# Patient Record
Sex: FEMALE | Race: WHITE | NOT HISPANIC OR LATINO | Employment: FULL TIME | URBAN - METROPOLITAN AREA
[De-identification: names, ages, dates, MRNs, and addresses within clinical notes are randomized per-mention and may not be internally consistent; named-entity substitution may affect disease eponyms.]

---

## 2017-03-06 ENCOUNTER — OFFICE VISIT - HEALTHEAST (OUTPATIENT)
Dept: INTERNAL MEDICINE | Facility: CLINIC | Age: 27
End: 2017-03-06

## 2017-03-06 DIAGNOSIS — D17.20 LIPOMA OF ARM: ICD-10-CM

## 2017-03-06 DIAGNOSIS — R42 LIGHTHEADEDNESS: ICD-10-CM

## 2017-03-06 DIAGNOSIS — L72.3 SEBACEOUS CYST: ICD-10-CM

## 2017-03-06 DIAGNOSIS — Z78.9 NONSMOKER: ICD-10-CM

## 2017-11-30 ENCOUNTER — OFFICE VISIT - HEALTHEAST (OUTPATIENT)
Dept: FAMILY MEDICINE | Facility: CLINIC | Age: 27
End: 2017-11-30

## 2017-11-30 DIAGNOSIS — N39.0 UTI (URINARY TRACT INFECTION): ICD-10-CM

## 2017-11-30 DIAGNOSIS — R11.0 NAUSEA: ICD-10-CM

## 2018-06-14 ENCOUNTER — OFFICE VISIT - HEALTHEAST (OUTPATIENT)
Dept: FAMILY MEDICINE | Facility: CLINIC | Age: 28
End: 2018-06-14

## 2018-06-14 DIAGNOSIS — R07.89 CHEST DISCOMFORT: ICD-10-CM

## 2018-06-14 LAB
ANION GAP SERPL CALCULATED.3IONS-SCNC: 13 MMOL/L (ref 5–18)
BUN SERPL-MCNC: 10 MG/DL (ref 8–22)
CHLORIDE BLD-SCNC: 102 MMOL/L (ref 98–107)
CO2 SERPL-SCNC: 26 MMOL/L (ref 22–31)
CREAT SERPL-MCNC: 0.7 MG/DL (ref 0.7–1.3)
GLUCOSE BLD-MCNC: 98 MG/DL (ref 70–125)
POTASSIUM BLD-SCNC: 3.8 MMOL/L (ref 3.5–5.5)
SODIUM SERPL-SCNC: 141 MMOL/L (ref 136–145)

## 2021-05-30 VITALS — BODY MASS INDEX: 20.44 KG/M2 | WEIGHT: 126 LBS

## 2021-05-31 VITALS — BODY MASS INDEX: 20.18 KG/M2 | WEIGHT: 124.4 LBS

## 2021-06-01 VITALS — BODY MASS INDEX: 20.44 KG/M2 | WEIGHT: 126 LBS

## 2021-06-14 NOTE — PROGRESS NOTES
Assessment:       UTI       Plan:      Antibiotics as prescribed    Fluids  Discussed signs of worsening infection and when to follow-up with PCP if no symptom improvement.    Patient Instructions   Analysis of your urine showed signs of a urinary tract infection.     Take the prescribed antibiotics for the entire course, even if symptoms improve.  You should expect improvement to begin in 24 hours. Drink plenty of fluids.    Reasons to come back for re-evaluation:  - Develop a fever, back or flank pain, or nausea and vomiting  - If symptoms have not completely improved after 72 hours  - Blood or dark black stools or vomiting          Subjective:       Pat Schulz is a 27 y.o. female who presents for evaluation of nausea. Onset of symptoms was 3 days ago. Patient describes nausea as moderate. Denies vomiting. Symptoms have been associated with mild abdominal pain and possible dry blood in her urine.  Patient denies fever, hematemesis and melena, as well as urinary symptoms including dysuria, frequency, and urgency. Patient currently has an IUD and expresses concern for ectopic pregnancy. Symptoms have waxed and waned.  Evaluation to date has been none. Treatment to date has been none.   The following portions of the patient's history were reviewed and updated as appropriate: allergies, current medications and problem list.    Review of Systems  Pertinent items are noted in HPI.      Objective:      /68  Pulse (!) 101  Temp 98  F (36.7  C) (Oral)   Resp 18  Wt 124 lb 6.4 oz (56.4 kg)  SpO2 100%  BMI 20.18 kg/m2  General appearance: alert, appears stated age, cooperative, mild distress and tearful  Lungs: clear to auscultation bilaterally and no rhonchi, rales, or wheezing  Heart: tachycardic, normal S1 and S2, no M/R/G   Back: no CVA tenderness  Abdomen: mild tenderness in LUQ and epigastric regions, soft, no gaurding, no masses, no organomegally, no rebound tenderness

## 2021-06-25 NOTE — PROGRESS NOTES
"Progress Notes by Mark Rogers MD at 3/6/2017  9:15 AM     Author: Mark Rogers MD Service: -- Author Type: Physician    Filed: 3/7/2017  8:10 AM Encounter Date: 3/6/2017 Status: Signed    : Mark Rogers MD (Physician)              [facility]    Comprehensive and complex medical care - Chronic disease management - Shared decision making - Care coordination - Compassionate care    Patient advocacy - Rational deprescribing - Minimally disruptive medicine - Ethical focus - Customized care    Date of Service: 3/6/2017  Primary Provider: G[facility]le Primary Care Provider    Patient Care Team:  No Primary Care Provider as PCP - General     ______________________________________________________________________     Patient's Pharmacy:    [facility] [id number] [address] [address] [address] [place] [address]  Phone: [phone] Fax: [phone]     Patient's Insurance:    Payor: MEDICA / Plan: [place] / Product Type: PMAP /     ______________________________________________________________________     Khristine ANNELISE MOVERN is 26 y.o. female who comes in today for:    Chief Complaint   Patient presents with   ? Second Opinion     \"fatty tissues\" in the shoulder neck and arm       Patient Active Problem List   Diagnosis   ? Nonsmoker     No current outpatient prescriptions on file.     Social History     Social History Narrative     ______________________________________________________________________     History of present illness:    The patient comes in today first to have a number of spots evaluated.  She's been told in the past that they're either fatty lumps or oil cyst.    She has one on her right upper back near the trapezius.  It's been there for a while.  It doesn't really fast on her getting larger.  It doesn't bother her otherwise.    She has one on her left arm.  This also does not bother her besides the fact that it is there.    We " reviewed her lightheadedness with her again.  She denies any anxiety.  It is when she gets up quickly that it occurs, but doesn't notice it any other time.  She denies any other balance issues with her.  Her blood work from a few years ago was reviewed with her.  She has no major issues with this.  She is not concerned about any particular disease.    On review of systems, the patient denies any shortness of breath.    ______________________________________________________________________    Exam:    Wt Readings from Last 3 Encounters:   03/06/17 126 lb (57.2 kg)   02/26/15 131 lb 4 oz (59.5 kg)     BP Readings from Last 3 Encounters:   03/06/17 100/64   02/26/15 98/64     Visit Vitals   ? /64   ? Temp 97.3  F (36.3  C) (Oral)   ? Wt 126 lb (57.2 kg)   ? BMI 20.44 kg/m2      The patient is comfortable, no acute distress.  Mood good.  Insight fair.  Eyes are nonicteric.  Neck is supple without mass.  No cervical adenopathy.  No thyromegaly. Heart regular rate and rhythm.  Lungs clear to auscultation bilaterally.  Respiratory effort is good.  Abdomen soft and nontender.  No hepatosplenomegaly.  Extremities no edema.  There is a sebaceous cyst of her right back and a lipoma of her left arm.      ______________________________________________________________________    Diagnostics:    Results for orders placed or performed in visit on 02/26/15   Thyroid Stimulating Hormone (TSH)   Result Value Ref Range    TSH 1.24 0.30 - 5.05 uIU/mL   Basic Metabolic Panel   Result Value Ref Range    Sodium 141 136 - 145 mmol/L    Potassium 3.4 (L) 3.5 - 5.0 mmol/L    Chloride 106 98 - 107 mmol/L    CO2 24 22 - 31 mmol/L    Anion Gap, Calculation 11 5 - 18 mmol/L    Glucose 80 70 - 125 mg/dL    Calcium 9.4 8.5 - 10.5 mg/dL    BUN 8 8 - 22 mg/dL    Creatinine 0.70 0.60 - 1.10 mg/dL    GFR MDRD Af Amer >60 >60 mL/min/1.73m2    GFR MDRD Non Af Amer >60 >60 mL/min/1.73m2   Hepatic Profile   Result Value Ref Range    Bilirubin, Total  2.4 (H) 0.0 - 1.0 mg/dL    Bilirubin, Direct 0.5 <=0.5 mg/dL    Protein, Total 6.9 6.0 - 8.0 g/dL    Albumin 4.2 3.5 - 5.0 g/dL    Alkaline Phosphatase 61 45 - 120 U/L    AST 12 0 - 40 U/L    ALT 8 0 - 45 U/L   HM1 (CBC with Diff)   Result Value Ref Range    WBC 4.9 4.0 - 11.0 thou/uL    RBC 4.70 3.80 - 5.40 mill/uL    Hemoglobin 13.5 12.0 - 16.0 g/dL    Hematocrit 39.3 35.0 - 47.0 %    MCV 84 80 - 100 fL    MCH 28.7 27.0 - 34.0 pg    MCHC 34.3 32.0 - 36.0 g/dL    RDW 12.9 11.0 - 14.5 %    Platelets 257 140 - 440 thou/uL    MPV 6.8 (L) 7.0 - 10.0 fL    Neutrophils % 52 50 - 70 %    Lymphocytes % 39 20 - 40 %    Monocytes % 6 2 - 10 %    Eosinophils % 3 0 - 6 %    Basophils % 0 0 - 2 %    Neutrophils Absolute 2.5 2.0 - 7.7 thou/uL    Lymphocytes Absolute 1.9 0.8 - 4.4 thou/uL    Monocytes Absolute 0.3 0.0 - 0.9 thou/uL    Eosinophils Absolute 0.2 0.0 - 0.4 thou/uL    Basophils Absolute 0.0 0.0 - 0.2 thou/uL      ______________________________________________________________________    Assessment:    1. Sebaceous cyst    2. Nonsmoker    3. Lipoma of arm    4. Lightheadedness       ______________________________________________________________________      No Data Recorded  No Data Recorded    Plan:    1. Patient reassured about sebaceous cyst on her right back.  2. Lipoma on the left arm not severe enough to do anything about.  3. We talked a lot about her lightheadedness today and mainly reassured her about this.  There is no signs of serious disease.    Mark Rogers MD  General Internal Medicine  [facility]     Return if symptoms worsen or fail to improve.

## 2021-06-26 NOTE — PROGRESS NOTES
Progress Notes by Annelise Portillo MD at 6/14/2018 10:50 AM     Author: Annelise Portillo MD Service: -- Author Type: Physician    Filed: 6/14/2018 12:29 PM Encounter Date: 6/14/2018 Status: Addendum    : Annelise Portillo MD (Physician)    Related Notes: Original Note by Annelise Portillo MD (Physician) filed at 6/14/2018 12:27 PM         Subjective:   Pat Schulz is a 27 y.o. female  No question data found.  Chief Complaint   Patient presents with   ? Chest Pain     Pt is having heaviness to the chest area.Has been an ongoing problem for a few years.    Says she gets some chest flicker in her chest, lightening bolt. It happens at any time, and not related to any activity or eating. Says she stops doing something, but more from the surprise of it. The sensation lasts about a second. Denies any shortness of breath when she gets the sensation.  Says that the chest discomfort can happen once in 6 months, and that it comes very randomly. Says she came in because she felt a heaviness on the left side of her chest last night which lasted for a couple of hours. She felt like she had to catch her breath. Her mom and grandmother have COPD. Denies any family history of COPD. Pt is a non smoker. She is concerned that it may be heart related. Admits she is under more marital stress currently. Denies any history of HTN, DM, and high cholesterol. Denies any history of heartburn, feeling like food is stuck or having a sour taste in the back of her throat. Says she used to have acid reflux symptoms as a teen, and took medication for it. Denies any recent fevers, chills, nasal congestion, runny nose, cough or headache. Sometimes she feels like she is involuntarily having the jitters. Admits that patient has been eating, drinking and urinating normally.  Denies any recent belly pain, nausea, vomiting or diarrhea. Admits some left breast discomfort with pressure during sex.  Says that she has not started any recent  exercise program and does not do any heavy lifting at work at [employer].    Review of Systems  See HPI for ROS, otherwise all other systems negative    Allergies   Allergen Reactions   ? Pollen Extracts      No current outpatient prescriptions on file.  Patient Active Problem List   Diagnosis   ? Nonsmoker     Medical History Reviewed  Objective:     Vitals:    06/14/18 1106   BP: 100/60   Pulse: 95   Resp: 14   Temp: 98.8  F (37.1  C)   TempSrc: Oral   SpO2: 99%   Weight: 126 lb (57.2 kg)   Gen - Pt in NAD  Eyes - Conjunctiva non injected, no drainage  Face - non TTP over frontal sinus areas; non TTP over maxillary areas  Ears - external canals - no induration, Right TM - not injected, Left TM - occluded with cerumen   Nose - non congested, no nasal drainage  Pharynx - not injected, tonsils 1+ size  Neck - supple, no cervical adenopathy, no masses  Cor - RRR w/o murmur  Chest wall - non TTP  Lungs - Good air entry; no wheezes or crackles noted on auscultation - no coughing noted  Skin - no lesions, no rashes noted  Psych - Affect - euthymic, well groomed, speech not pressured, good insight, no flight of ideas  PHQ 2 = 0    Results for orders placed or performed in visit on 06/14/18   ISTAT CHEM 7 ([facility] CLINIC ONLY)   Result Value Ref Range    Sodium 141 136 - 145 mmol/L    Potassium 3.8 3.5 - 5.5 mmol/L    CO2 26 22 - 31 mmol/L    Chloride 102 98 - 107 mmol/L    Anion Gap, Calculation 13 5 - 18 mmol/L    Glucose 98 70 - 125 mg/dL    BUN 10 8 - 22 mg/dL    Creatinine 0.70 0.70 - 1.30 mg/dL     Lab result discussed on day of visit.      Assessment - Plan   Medical Decision Making -27-year-old woman presents with random intermittent episodes of left-sided chest discomfort.  Patient has no cardiac risk factors.  Patient's vital signs were entirely within normal limits and her exam was negative.  I-STAT was negative, ruling out diabetes or kidney etiologies as a cause of her discomfort.  Because of patient not having any  cardiac risk factors and no family history, and EKG was not indicated.  Patient does have a history of reflux but no recent reflux symptoms.  She also has admits that she has been under more marital stress lately.  Discussed with patient indications for either calling the care line or calling 911.  Patient's symptoms seem to be more anxiety/panic attacks at this time.    1. Chest discomfort  - ISTAT CHEM 7 ([facility] CLINIC ONLY)    At the conclusion of the encounter, assessment and plan were discussed.   All questions were answered.   The patient or guardian acknowledged understanding and was involved in the decision making regarding the overall care plan.    25 minutes spent with the patient with greater than 50% of time spent discussing symptoms, treatment options, counseling and/or coordination of care.     Patient Instructions   1. Keep appointment with primary care on 6/25  2. Keep well hydrated  3. Keep track of what you are doing when you have another episode of chest discomfort and how long the discomfort last  4. If symptoms are getting worse over time or you have any questions, call the clinic number - it's answered 24/7    Uncertain Causes of Chest Pain    Chest pain can happen for a number of reasons. Sometimes the cause can't be determined. If your condition does not seem serious, and your pain does not appear to be coming from your heart, your healthcare provider may recommend watching it closely. Sometimes the signs of a serious problem take more time to appear. Many problems not related to your heart can cause chest pain.These include:    Musculoskeletal. Costochondritis, an inflammation of the tissues around the ribs that can occur from trauma or overuse injuries    Respiratory. Pneumonia, pneumothorax, or pneumonitis (inflammation of the lining of the chest and lungs)    Gastrointestinal. Esophageal reflux, heartburn, or gallbladder disease    Anxiety and panic disorders    Nerve compression and  neuritis    Miscellaneous problems such as aortic aneurysm or pulmonary embolism (a blood clot in the lungs)  Home care  After your visit, follow these recommendations:    Rest today and avoid strenuous activity.    Take any prescribed medicine as directed.    Be aware of any recurrent chest pain and notice any changes  Follow-up care  Follow up with your healthcare provider if you do not start to feel better within 24 hours, or as advised.  Call 911  Call 911 if any of these occur:    A change in the type of pain: if it feels different, becomes more severe, lasts longer, or begins to spread into your shoulder, arm, neck, jaw or back    Shortness of breath or increased pain with breathing    Weakness, dizziness, or fainting    Rapid heart beat    Crushing sensation in your chest  When to seek medical advice  Call your healthcare provider right away if any of the following occur:    Cough with dark colored sputum (phlegm) or blood    Fever of 100.4 F (38 C) or higher, or as directed by your healthcare provider    Swelling, pain or redness in one leg    Shortness of breath  Date Last Reviewed: 12/30/2015 2000-2017 [employer]. [address] [place] [address]. All rights reserved. This information is not intended as a substitute for professional medical care. Always follow your healthcare professional's instructions.

## 2021-08-21 ENCOUNTER — HEALTH MAINTENANCE LETTER (OUTPATIENT)
Age: 31
End: 2021-08-21

## 2021-10-16 ENCOUNTER — HEALTH MAINTENANCE LETTER (OUTPATIENT)
Age: 31
End: 2021-10-16

## 2021-10-20 ENCOUNTER — OFFICE VISIT (OUTPATIENT)
Dept: FAMILY MEDICINE | Facility: CLINIC | Age: 31
End: 2021-10-20
Payer: COMMERCIAL

## 2021-10-20 VITALS
OXYGEN SATURATION: 100 % | BODY MASS INDEX: 21.89 KG/M2 | WEIGHT: 136.19 LBS | HEIGHT: 66 IN | DIASTOLIC BLOOD PRESSURE: 70 MMHG | HEART RATE: 92 BPM | SYSTOLIC BLOOD PRESSURE: 110 MMHG

## 2021-10-20 DIAGNOSIS — S90.31XA TRAUMATIC ECCHYMOSIS OF RIGHT FOOT, INITIAL ENCOUNTER: ICD-10-CM

## 2021-10-20 DIAGNOSIS — G89.29 CHRONIC BILATERAL LOW BACK PAIN WITHOUT SCIATICA: ICD-10-CM

## 2021-10-20 DIAGNOSIS — Z97.5 IUD (INTRAUTERINE DEVICE) IN PLACE: ICD-10-CM

## 2021-10-20 DIAGNOSIS — Z28.21 COVID-19 VACCINATION DECLINED: ICD-10-CM

## 2021-10-20 DIAGNOSIS — M54.50 CHRONIC BILATERAL LOW BACK PAIN WITHOUT SCIATICA: ICD-10-CM

## 2021-10-20 DIAGNOSIS — Z00.00 ROUTINE GENERAL MEDICAL EXAMINATION AT A HEALTH CARE FACILITY: Primary | ICD-10-CM

## 2021-10-20 DIAGNOSIS — Z23 NEEDS FLU SHOT: ICD-10-CM

## 2021-10-20 LAB
CHOLEST SERPL-MCNC: 168 MG/DL
ERYTHROCYTE [DISTWIDTH] IN BLOOD BY AUTOMATED COUNT: 11.6 % (ref 10–15)
FASTING STATUS PATIENT QL REPORTED: NO
HBA1C MFR BLD: 5.5 % (ref 0–5.6)
HCT VFR BLD AUTO: 43.5 % (ref 35–47)
HDLC SERPL-MCNC: 51 MG/DL
HGB BLD-MCNC: 14.6 G/DL (ref 11.7–15.7)
LDLC SERPL CALC-MCNC: 102 MG/DL
MCH RBC QN AUTO: 29.8 PG (ref 26.5–33)
MCHC RBC AUTO-ENTMCNC: 33.6 G/DL (ref 31.5–36.5)
MCV RBC AUTO: 89 FL (ref 78–100)
PLATELET # BLD AUTO: 276 10E3/UL (ref 150–450)
RBC # BLD AUTO: 4.9 10E6/UL (ref 3.8–5.2)
TRIGL SERPL-MCNC: 73 MG/DL
TSH SERPL DL<=0.005 MIU/L-ACNC: 1.29 UIU/ML (ref 0.3–5)
WBC # BLD AUTO: 6.2 10E3/UL (ref 4–11)

## 2021-10-20 PROCEDURE — 90471 IMMUNIZATION ADMIN: CPT | Performed by: STUDENT IN AN ORGANIZED HEALTH CARE EDUCATION/TRAINING PROGRAM

## 2021-10-20 PROCEDURE — 85027 COMPLETE CBC AUTOMATED: CPT | Performed by: STUDENT IN AN ORGANIZED HEALTH CARE EDUCATION/TRAINING PROGRAM

## 2021-10-20 PROCEDURE — 87624 HPV HI-RISK TYP POOLED RSLT: CPT | Performed by: STUDENT IN AN ORGANIZED HEALTH CARE EDUCATION/TRAINING PROGRAM

## 2021-10-20 PROCEDURE — 84443 ASSAY THYROID STIM HORMONE: CPT | Performed by: STUDENT IN AN ORGANIZED HEALTH CARE EDUCATION/TRAINING PROGRAM

## 2021-10-20 PROCEDURE — 99385 PREV VISIT NEW AGE 18-39: CPT | Mod: 25 | Performed by: STUDENT IN AN ORGANIZED HEALTH CARE EDUCATION/TRAINING PROGRAM

## 2021-10-20 PROCEDURE — 83036 HEMOGLOBIN GLYCOSYLATED A1C: CPT | Performed by: STUDENT IN AN ORGANIZED HEALTH CARE EDUCATION/TRAINING PROGRAM

## 2021-10-20 PROCEDURE — 36415 COLL VENOUS BLD VENIPUNCTURE: CPT | Performed by: STUDENT IN AN ORGANIZED HEALTH CARE EDUCATION/TRAINING PROGRAM

## 2021-10-20 PROCEDURE — G0123 SCREEN CERV/VAG THIN LAYER: HCPCS | Performed by: STUDENT IN AN ORGANIZED HEALTH CARE EDUCATION/TRAINING PROGRAM

## 2021-10-20 PROCEDURE — 90686 IIV4 VACC NO PRSV 0.5 ML IM: CPT | Performed by: STUDENT IN AN ORGANIZED HEALTH CARE EDUCATION/TRAINING PROGRAM

## 2021-10-20 PROCEDURE — 99213 OFFICE O/P EST LOW 20 MIN: CPT | Mod: 25 | Performed by: STUDENT IN AN ORGANIZED HEALTH CARE EDUCATION/TRAINING PROGRAM

## 2021-10-20 PROCEDURE — G0124 SCREEN C/V THIN LAYER BY MD: HCPCS | Performed by: PATHOLOGY

## 2021-10-20 PROCEDURE — 80061 LIPID PANEL: CPT | Performed by: STUDENT IN AN ORGANIZED HEALTH CARE EDUCATION/TRAINING PROGRAM

## 2021-10-20 ASSESSMENT — MIFFLIN-ST. JEOR: SCORE: 1354.49

## 2021-10-20 NOTE — PROGRESS NOTES
SUBJECTIVE:   CC: Pat Schulz is an 31 year old woman who presents for preventive health visit.     Patient has been advised of split billing requirements and indicates understanding: Yes     Healthy Habits:     Getting at least 3 servings of Calcium per day:  Yes    Bi-annual eye exam:  NO    Dental care twice a year:  NO    Sleep apnea or symptoms of sleep apnea:  None    Diet:  Regular (no restrictions)    Frequency of exercise:  1 day/week    Duration of exercise:  N/A    Taking medications regularly:  Not Applicable    Medication side effects:  Not applicable    PHQ-2 Total Score: 0    Additional concerns today:  No       Acute concerns:     Ran into her mom's walker yesterday and pinky on the right side got hit. Immediate pain, swelling and bruising. Today, still quite bruised and hurts a bit less.     Feels like left ear is blocked. No Q tip. No drainage. No ear pain or loss of hearing. Fees muffled hearing.     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     Today's PHQ-2 Score:   PHQ-2 ( 1999 [employer]) 10/20/2021   Q1: Little interest or pleasure in doing things 0   Q2: Feeling down, depressed or hopeless 0   PHQ-2 Score 0   Q1: Little interest or pleasure in doing things Not at all   Q2: Feeling down, depressed or hopeless Not at all   PHQ-2 Score 0       Abuse: Current or Past (Physical, Sexual or Emotional) - No  Do you feel safe in your environment? Yes    Have you ever done Advance Care Planning? (For example, a Health Directive, POLST, or a discussion with a medical provider or your loved ones about your wishes): No, advance care planning information given to patient to review.  Advanced care planning was discussed at today's visit.    Social History     Tobacco Use     Smoking status: Never Smoker     Smokeless tobacco: Never Used   Substance Use Topics     Alcohol use: Not on file     If you drink alcohol do you typically have >3 drinks per day or >7 drinks per week?  No  Cigarettes/vaping/marijuana/drugs: no     Alcohol Use 10/20/2021   Prescreen: >3 drinks/day or >7 drinks/week? Not Applicable   Prescreen: >3 drinks/day or >7 drinks/week? -     Reviewed orders with patient.  Reviewed health maintenance and updated orders accordingly - Yes  Lab work is in process  Labs reviewed in EPIC  BP Readings from Last 3 Encounters:   10/20/21 110/70    Wt Readings from Last 3 Encounters:   10/20/21 61.8 kg (136 lb 3 oz)   18 57.2 kg (126 lb)   17 56.4 kg (124 lb 6.4 oz)                  Patient Active Problem List   Diagnosis     Nonsmoker     IUD (intrauterine device) in place     Past Surgical History:   Procedure Laterality Date      SECTION       twice       Social History     Tobacco Use     Smoking status: Never Smoker     Smokeless tobacco: Never Used   Substance Use Topics     Alcohol use: Not on file     Family History   Problem Relation Age of Onset     Chronic Obstructive Pulmonary Disease Mother      Hypertension Mother      Heart Failure Mother      Diabetes Father      Diabetes Type 1 Brother          Current Outpatient Medications   Medication Sig Dispense Refill     levonorgestrel (MIRENA) 20 MCG/24HR IUD 1 each by Intrauterine route once       Allergies   Allergen Reactions     Pollen Extracts [Pollen Extract] Unknown       Breast Cancer Screening:  Any new diagnosis of family breast, ovarian, or bowel cancer? No    Pertinent mammograms are reviewed under the imaging tab.    Lung cancer in grandma - was a smoker     History of abnormal Pap smear: Does not recall when her last Pap smear was.  Does not recall any history of abnormal Pap smears.     Reviewed and updated as needed this visit by clinical staff  Tobacco  Allergies  Meds   Med Hx  Surg Hx  Fam Hx          Reviewed and updated as needed this visit by Provider      Med Hx  Surg Hx  Fam Hx         History reviewed. No pertinent past medical history.   Past Surgical History:   Procedure  "Laterality Date      SECTION       twice       Review of Systems  CONSTITUTIONAL: NEGATIVE for fever, chills, change in weight  INTEGUMENTARU/SKIN: NEGATIVE for worrisome rashes, moles or lesions  EYES: NEGATIVE for vision changes or irritation  ENT: NEGATIVE for ear, mouth and throat problems  RESP: NEGATIVE for significant cough or SOB  BREAST: NEGATIVE for masses, tenderness or discharge  CV: NEGATIVE for chest pain, palpitations or peripheral edema  GI: NEGATIVE for nausea, abdominal pain, heartburn, or change in bowel habits  : NEGATIVE for unusual urinary or vaginal symptoms. Periods are regular.  MUSCULOSKELETAL: NEGATIVE for significant arthralgias or myalgia  NEURO: NEGATIVE for weakness, dizziness or paresthesias  PSYCHIATRIC: NEGATIVE for changes in mood or affect     OBJECTIVE:   /70 (BP Location: Right arm, Patient Position: Sitting)   Pulse 92   Ht 1.676 m (5' 6\")   Wt 61.8 kg (136 lb 3 oz)   LMP 10/16/2021 (Exact Date)   SpO2 100%   BMI 21.98 kg/m    Physical Exam  GENERAL: healthy, alert and no distress  EYES: Eyes grossly normal to inspection, PERRL and conjunctivae and sclerae normal  HENT: ear canals and TM's normal, nose and mouth without ulcers or lesions  NECK: no adenopathy, no asymmetry, masses, or scars and thyroid normal to palpation  RESP: lungs clear to auscultation - no rales, rhonchi or wheezes  CV: regular rate and rhythm, normal S1 S2, no S3 or S4, no murmur, click or rub, no peripheral edema and peripheral pulses strong  ABDOMEN: soft, nontender, no hepatosplenomegaly, no masses and bowel sounds normal   (female): normal female external genitalia, normal urethral meatus, vaginal mucosa pink, moist, well rugated, and normal cervix without masses or discharge.  No CMT.  IUD strings visualized.  MS: no gross musculoskeletal defects noted, no edema.  Patient has no pinpoint bony tenderness along the palpation of her spine.  Patient has hypertonic quadratus " lumborum muscles bilaterally.  Patient has significant bruising along the medial aspect of the right pinky toe with pinpoint tenderness of the MTP joint. (+) pain with axial loading of the joint.   SKIN: no suspicious lesions or rashes  NEURO: Normal strength and tone, mentation intact and speech normal  PSYCH: mentation appears normal, affect normal/bright    Diagnostic Test Results:  Labs reviewed in Epic    ASSESSMENT/PLAN:   Pat was seen today for physical and foot pain.    Diagnoses and all orders for this visit:    Routine general medical examination at a health care facility  COVID-19 vaccination declined  Normal BMI  No high risk behaviors  COVID-19 vaccine declined, ordered flu  Baseline blood work-due, ordered  Pap smear collected today  Birth control: IUD in place  Sexually active -yes, male partner, no concerns for STI   Feels safe in her relationship  Has 2 children s/p   Pap collected today  ACP done  -     TSH with free T4 reflex; Future  -     Lipid Profile (Chol, Trig, HDL, LDL calc); Future  -     Hemoglobin A1c; Future  -     CBC with platelets; Future  -     INFLUENZA VACCINE IM > 6 MONTHS VALENT IIV4 (AFLURIA/FLUZONE)  -     PAP screen with HPV - recommended age 30 - 65 years    IUD (intrauterine device) in place  Placed for second time in  - remove . Has irregular periods on the IUD. Is not bothersome     Chronic bilateral low back pain without sciatica  Low back pain seems to be 2/2 overuse injury in the setting of physically demanding occupation.  She is welcome to continue to go to chiropractor if she feels that that helps but I believe that physical therapy may be a better long-term fix.  Will refer to physical therapy to help strengthen the back.  Patient amenable.  -     Physical Therapy Referral; Future    Traumatic ecchymosis of right foot, initial encounter  Physical exam consistent with significant ecchymosis along the medial aspect of the right pinky toe.   "Pinpoint tenderness of the area as well.  Will get x-ray to rule out fracture.  -     XR Foot Right G/E 3 Views; Future      Other orders  -     REVIEW OF HEALTH MAINTENANCE PROTOCOL ORDERS    Patient has been advised of split billing requirements and indicates understanding: Yes  COUNSELING:  Reviewed preventive health counseling, as reflected in patient instructions    Estimated body mass index is 21.98 kg/m  as calculated from the following:    Height as of this encounter: 1.676 m (5' 6\").    Weight as of this encounter: 61.8 kg (136 lb 3 oz).        She reports that she has never smoked. She has never used smokeless tobacco.      Kavita Brock DO  [facility]  "

## 2021-10-20 NOTE — LETTER
October 27, 2021      Pat Schulz  [address]  [address]  [place] [address]        Dear ,    We are writing to inform you of your test results.    Your do have small fracture in your pinky toe. Would recommend elevation of your foot, icing the area 15 min at a time up to three times a day and avoidance of tight fitting shoes. Would like you to mervat tape the foot as to help with pain and stabilization of the fracture. Place a cotton ball between the two toes that you will be mervat taping to avoid skin breakdown. t will take 4-6 weeks for it to heal. Ok to do iburpofen/tylenol for pain control.    Resulted Orders   XR Foot Right G/E 3 Views    Narrative    EXAM DATE:         10/20/2021    EXAM: X-RAY FOOT RIGHT, MINIMUM 3 VIEWS  LOCATION: [facility]  DATE/TIME: 10/20/2021 3:30 PM    INDICATION: (Plus) traumatic ecchymosis of pinky toe, suspect fracture. Problem/pain.  COMPARISON: None.    IMPRESSION: There are two adjacent tiny age-indeterminate avulsion fracture fragments at the medial aspect of the fifth PIP joint and distal aspect of the proximal phalanx.             TSH with free T4 reflex   Result Value Ref Range    TSH 1.29 0.30 - 5.00 uIU/mL   Lipid Profile (Chol, Trig, HDL, LDL calc)   Result Value Ref Range    Cholesterol 168 <=199 mg/dL    Triglycerides 73 <=149 mg/dL    Direct Measure HDL 51 >=50 mg/dL      Comment:      HDL Cholesterol Reference Range:     0-2 years:   No reference ranges established for patients under 2 years old  at [facility] for lipid analytes.    2-8 years:  Greater than 45 mg/dL     18 years and older:   Female: Greater than or equal to 50 mg/dL   Male:   Greater than or equal to 40 mg/dL    LDL Cholesterol Calculated 102 <=129 mg/dL    Patient Fasting > 8hrs? No    Hemoglobin A1c   Result Value Ref Range    Hemoglobin A1C 5.5 0.0 - 5.6 %      Comment:      Normal <5.7%   Prediabetes 5.7-6.4%    Diabetes 6.5% or higher      Note: Adopted from ADA consensus guidelines.   CBC with platelets   Result Value Ref Range    WBC Count 6.2 4.0 - 11.0 10e3/uL    RBC Count 4.90 3.80 - 5.20 10e6/uL    Hemoglobin 14.6 11.7 - 15.7 g/dL    Hematocrit 43.5 35.0 - 47.0 %    MCV 89 78 - 100 fL    MCH 29.8 26.5 - 33.0 pg    MCHC 33.6 31.5 - 36.5 g/dL    RDW 11.6 10.0 - 15.0 %    Platelet Count 276 150 - 450 10e3/uL       If you have any questions or concerns, please call the clinic at the number listed above.       Sincerely,      Kavita Brock, DO

## 2021-10-22 LAB
HUMAN PAPILLOMA VIRUS 16 DNA: POSITIVE
HUMAN PAPILLOMA VIRUS 18 DNA: NEGATIVE
HUMAN PAPILLOMA VIRUS FINAL DIAGNOSIS: ABNORMAL
HUMAN PAPILLOMA VIRUS OTHER HR: POSITIVE

## 2021-10-28 ENCOUNTER — PATIENT OUTREACH (OUTPATIENT)
Dept: FAMILY MEDICINE | Facility: CLINIC | Age: 31
End: 2021-10-28

## 2021-10-28 LAB
BKR LAB AP GYN ADEQUACY: ABNORMAL
BKR LAB AP GYN INTERPRETATION: ABNORMAL
BKR LAB AP HPV REFLEX: ABNORMAL
BKR LAB AP LMP: ABNORMAL
BKR LAB AP PREVIOUS ABNORMAL: ABNORMAL
PATH REPORT.COMMENTS IMP SPEC: ABNORMAL
PATH REPORT.RELEVANT HX SPEC: ABNORMAL

## 2021-11-18 ENCOUNTER — OFFICE VISIT (OUTPATIENT)
Dept: INTERNAL MEDICINE | Facility: CLINIC | Age: 31
End: 2021-11-18
Payer: COMMERCIAL

## 2021-11-18 VITALS — BODY MASS INDEX: 21.79 KG/M2 | HEART RATE: 98 BPM | OXYGEN SATURATION: 98 % | WEIGHT: 135 LBS

## 2021-11-18 DIAGNOSIS — R87.611 ATYPICAL SQUAMOUS CELLS CANNOT EXCLUDE HIGH GRADE SQUAMOUS INTRAEPITHELIAL LESION ON CYTOLOGIC SMEAR OF CERVIX (ASC-H): ICD-10-CM

## 2021-11-18 DIAGNOSIS — Z01.812 PRE-PROCEDURE LAB EXAM: Primary | ICD-10-CM

## 2021-11-18 DIAGNOSIS — Z11.3 ROUTINE SCREENING FOR STI (SEXUALLY TRANSMITTED INFECTION): ICD-10-CM

## 2021-11-18 LAB — HCG UR QL: NEGATIVE

## 2021-11-18 PROCEDURE — 2894A SURGICAL PATHOLOGY EXAM: CPT | Mod: 59 | Performed by: PATHOLOGY

## 2021-11-18 PROCEDURE — 81025 URINE PREGNANCY TEST: CPT | Performed by: NURSE PRACTITIONER

## 2021-11-18 PROCEDURE — 57454 BX/CURETT OF CERVIX W/SCOPE: CPT | Performed by: NURSE PRACTITIONER

## 2021-11-18 PROCEDURE — 87591 N.GONORRHOEAE DNA AMP PROB: CPT | Performed by: NURSE PRACTITIONER

## 2021-11-18 PROCEDURE — 87491 CHLMYD TRACH DNA AMP PROBE: CPT | Performed by: NURSE PRACTITIONER

## 2021-11-18 PROCEDURE — 88305 TISSUE EXAM BY PATHOLOGIST: CPT | Performed by: PATHOLOGY

## 2021-11-18 NOTE — PROGRESS NOTES
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  No history of abnormal pap smears. No tobacco use. She is in a relationship and may try to conceive in the next 1 year.     Procedure Details   Body mass index is 21.79 kg/m .    The reason for procedure is explained, and verbal informed consent is obtained.    Speculum is placed in the vagina and visualization of cervix is achieved.   Visualization of the cervix: fully visible  Visualization of the squamocolumnar junction: fully visible    The cervix is swabbed with 3% acetic acid solution. There are thin acetowhite changes at 7 o'clock, favored to be metaplasia, and fine punctation noted at 6:00. Biopsy is taken at these two sites. ECC also completed. The patient tolerated the procedure well.    Impression: Normal/low grade     Plan: Specimen labelled and sent to Pathology. Post procedure instructions are reviewed.  The role of HPV in causing cervical pap smear abnormalities, dysplasia, and cancer is reviewed with the patient. She will be contacted by phone with biopsy results and recommendations.      Addendum 11/24/21: biopsy is negative for HEIDY. Return for co-testing in 1 year.

## 2021-11-19 LAB
C TRACH DNA SPEC QL NAA+PROBE: NEGATIVE
N GONORRHOEA DNA SPEC QL NAA+PROBE: NEGATIVE

## 2021-11-23 LAB
PATH REPORT.COMMENTS IMP SPEC: NORMAL
PATH REPORT.FINAL DX SPEC: NORMAL
PATH REPORT.GROSS SPEC: NORMAL
PATH REPORT.MICROSCOPIC SPEC OTHER STN: NORMAL
PATH REPORT.RELEVANT HX SPEC: NORMAL
PHOTO IMAGE: NORMAL

## 2021-11-24 ENCOUNTER — TELEPHONE (OUTPATIENT)
Dept: INTERNAL MEDICINE | Facility: CLINIC | Age: 31
End: 2021-11-24
Payer: COMMERCIAL

## 2021-11-24 NOTE — TELEPHONE ENCOUNTER
The phone number listed is not in service. Will send patient a QReca! message with colposcopy biopsy results

## 2021-12-17 ENCOUNTER — PATIENT OUTREACH (OUTPATIENT)
Dept: FAMILY MEDICINE | Facility: CLINIC | Age: 31
End: 2021-12-17
Payer: COMMERCIAL

## 2022-04-27 ENCOUNTER — OFFICE VISIT (OUTPATIENT)
Dept: FAMILY MEDICINE | Facility: CLINIC | Age: 32
End: 2022-04-27
Payer: COMMERCIAL

## 2022-04-27 VITALS
DIASTOLIC BLOOD PRESSURE: 67 MMHG | SYSTOLIC BLOOD PRESSURE: 102 MMHG | BODY MASS INDEX: 22.47 KG/M2 | TEMPERATURE: 97.6 F | OXYGEN SATURATION: 100 % | RESPIRATION RATE: 22 BRPM | HEART RATE: 90 BPM | WEIGHT: 139.2 LBS

## 2022-04-27 DIAGNOSIS — Z48.02 VISIT FOR SUTURE REMOVAL: Primary | ICD-10-CM

## 2022-04-27 PROCEDURE — 99213 OFFICE O/P EST LOW 20 MIN: CPT | Performed by: PHYSICIAN ASSISTANT

## 2022-04-27 NOTE — PROGRESS NOTES
Patient presents with:  Suture Removal: Had stitches on 4/14/22 on Rt pinky side. 2 stitches not taken out. Pt states still feel sensitive at [facility] area      Clinical Decision Making:  Had a conversation with patient stating that she should avoid shear force and also basic treatment for the sutures after removal. Expected course of resolution and indication for return was gone over and questions were answered to patient/parent's satisfaction before discharge.        ICD-10-CM    1. Visit for suture removal  Z48.02        There are no Patient Instructions on file for this visit.    HPI:  Pat Schulz is a 31 year old female who presents today for wound check and suture removal from laceration on the right hand resolving from 4/14/2022.  Patient has no concerns to address and says that she is healing very well.    History obtained from chart review and the patient.    Problem List:  2021-10: IUD (intrauterine device) in place  2021-10: Atypical squamous cells cannot exclude high grade squamous   intraepithelial lesion on cytologic smear of cervix (ASC-H)  Nonsmoker      Past Medical History:   Diagnosis Date     Atypical squamous cells cannot exclude high grade squamous intraepithelial lesion on cytologic smear of cervix (ASC-H) 10/20/2021    10/20/21 ASC-H, +HR HPV 16 & other. Plan: colposcopy due before 1/20/22       Social History     Tobacco Use     Smoking status: Never Smoker     Smokeless tobacco: Never Used   Substance Use Topics     Alcohol use: Not on file       Review of Systems  As above in HPI otherwise negative.    Vitals:    04/27/22 1338   BP: 102/67   BP Location: Right arm   Patient Position: Sitting   Cuff Size: Adult Regular   Pulse: 90   Resp: 22   Temp: 97.6  F (36.4  C)   TempSrc: Oral   SpO2: 100%   Weight: 63.1 kg (139 lb 3.2 oz)       General: Patient is resting comfortably no acute distress is afebrile  HEENT: Head is normocephalic atraumatic   Skin: Without rash  non-diaphoretic  Musculoskeletal: Patient is able make a good close tight fist. Inspection of the right hand shows that there are 2 sutures on the distal end of the fifth metacarpal.  Wound is nonreactive.  Sutures are removed.  There is no wound dehiscence.  Patient tolerated entire procedure very well.    Physical Exam    At the end of the encounter, I discussed results, diagnosis, medications. Discussed red flags for immediate return to clinic/ER, as well as indications for follow up if no improvement. Patient understood and agreed to plan. Patient was stable for discharge.

## 2022-10-01 ENCOUNTER — HEALTH MAINTENANCE LETTER (OUTPATIENT)
Age: 32
End: 2022-10-01

## 2022-11-03 ENCOUNTER — PATIENT OUTREACH (OUTPATIENT)
Dept: FAMILY MEDICINE | Facility: CLINIC | Age: 32
End: 2022-11-03

## 2022-11-03 NOTE — LETTER
November 3, 2022      Pat Schulz  [address] [facility] [address]        Dear ,    This letter is to remind you that you are due for your follow-up Pap smear and Human Papillomavirus (HPV) test.    Please call [phone] to schedule your appointment at your earliest convenience.    If you have completed the appointment outside of the [facility] system, please have the records forwarded to our office. We will update your chart for your provider to review before your next annual wellness visit.     Thank you for choosing [facility]!      Sincerely,    Your [facility] Care Team

## 2022-12-29 NOTE — TELEPHONE ENCOUNTER
FYI to provider - Patient is lost to pap tracking follow-up. Attempts to contact pt have been made per reminder process and there has been no reply and/or no appt scheduled. Contact hx listed below.     10/20/21 ASC-H, +HR HPV 16 & other. Plan: colposcopy due before 1/20/22 11/18/21 Colpo bx atypia suspicious for HPV effect, ECC neg. Plan: cotest in 1 year  11/3/22 Reminder [place], Letter  12/1/22 Reminder call-lm  12/29/22 Lost to follow-up for pap tracking

## 2023-06-19 ENCOUNTER — NURSE TRIAGE (OUTPATIENT)
Dept: NURSING | Facility: CLINIC | Age: 33
End: 2023-06-19
Payer: COMMERCIAL

## 2023-06-19 NOTE — TELEPHONE ENCOUNTER
"Patient is sexually active and has the Murina IUD and is concerned that she may be pregnant. Patient spoke to her provider who placed the IUD and was told that it should be effective for a couple more years. Patient went to [facility] and is concerned about ectopic pregnancy. Patient has not completed a home pregnancy test.   Patient having some spotting. Yesterday she was having some left side neck pain but none today. Patient having some soreness and tenderness below the breasts but over the ribs. The pain is \"kind of like a band across her chest\".  No pain unless she is touching and pressing on this area. Some soreness when she stands up. This chest pain has been for the past two days. Patient denies injury or soreness pain due to working out.   Denies constant pain, back pain, shortness of breath, shoulder or jaw pain.   Protocol recommends see in office today.  [facility] location and hours given. Care advice given. Patient will call back with worsening symptoms.   Lacy Ribera RN   06/19/23 10:34 AM  [facility] Nurse Advisor      Reason for Disposition    Chest pain(s) lasting a few seconds persists > 3 days    Additional Information    Negative: Shock suspected (e.g., cold/pale/clammy skin, too weak to stand, low BP, rapid pulse)    Negative: Sounds like a life-threatening emergency to the triager    Negative: Abdominal pain and pregnant < 20 weeks    Negative: Vaginal bleeding and pregnant < 20 weeks    Negative: Vaginal discharge is main symptom    Negative: SEVERE abdominal pain (e.g., excruciating) and present > 1 hour    Negative: SEVERE vaginal bleeding (e.g., soaking 2 pads or tampons per hour and present 2 or more hours; 1 menstrual cup every 2 hours)    Negative: SEVERE dizziness (e.g., unable to stand, requires support to walk, feels like passing out now)    Negative: Patient sounds very sick or weak to the triager    Negative: MODERATE vaginal bleeding (e.g., soaking 1 pad or tampon per hour and present " > 6 hours; 1 menstrual cup every 6 hours)    Negative: Vaginal bleeding is WORSE than normal (e.g., heavier) and dizziness or lightheadedness    Negative: Constant abdominal pain and present > 2 hours    Negative: Caller has URGENT question and triager unable to answer question    Negative: MILD abdominal pain (e.g., does not interfere with normal activities) that comes and goes (cramps) and present > 48 hours    Negative: Passed out (i.e., fainted, collapsed and was not responding)    Negative: Difficult to awaken or acting confused (e.g., disoriented, slurred speech)    Negative: SEVERE difficulty breathing (e.g., struggling for each breath, speaks in single words)    Negative: Shock suspected (e.g., cold/pale/clammy skin, too weak to stand, low BP, rapid pulse)    Negative: Chest pain lasting longer than 5 minutes and ANY of the following:* Over 44 years old* Over 30 years old and at least one cardiac risk factor (e.g., diabetes mellitus, high blood pressure, high cholesterol, smoker, or strong family history of heart disease)* History of heart disease (i.e., angina, heart attack, heart failure, bypass surgery, takes nitroglycerin)* Pain is crushing, pressure-like, or heavy    Negative: Heart beating < 50 beats per minute OR > 140 beats per minute    Negative: Visible sweat on face or sweat dripping down face    Negative: Sounds like a life-threatening emergency to the triager    Negative: Followed an injury to chest    Negative: SEVERE chest pain    Negative: Pain also in shoulder(s) or arm(s) or jaw    Negative: Difficulty breathing    Negative: Cocaine use within last 3 days    Negative: Major surgery in the past month    Negative: Hip or leg fracture (broken bone) in past month (or had cast on leg or ankle in past month)    Negative: Illness requiring prolonged bedrest in past month (e.g., immobilization, long hospital stay)    Negative: Long-distance travel in past month (e.g., car, bus, train, plane; with  trip lasting 6 or more hours)    Negative: History of prior 'blood clot' in leg or lungs (i.e., deep vein thrombosis, pulmonary embolism)    Negative: History of inherited increased risk of blood clots (e.g., Factor 5 Leiden, Anti-thrombin 3, Protein C or Protein S deficiency, Prothrombin mutation)    Negative: Cancer treatment in the past two months (or has cancer now)    Negative: Heart beating irregularly or very rapidly    Negative: Chest pain lasting longer than 5 minutes and occurred in last 3 days (72 hours) (Exception: feels exactly the same as previously diagnosed heartburn and has accompanying sour taste in mouth)    Negative: Chest pain or 'angina' comes and goes and is happening more often (increasing in frequency) or getting worse (increasing in severity) (Exception: chest pains that last only a few seconds)    Negative: Dizziness or lightheadedness    Negative: Coughing up blood    Negative: Patient sounds very sick or weak to the triager    Negative: Patient says chest pain feels exactly the same as previously diagnosed 'heartburn' and describes burning in chest and accompanying sour taste in mouth    Negative: Fever > 100.4 F (38.0 C)    Protocols used: CHEST PAIN-A-OH, CONTRACEPTION - IUD SYMPTOMS AND [id number]

## 2023-10-15 ENCOUNTER — HEALTH MAINTENANCE LETTER (OUTPATIENT)
Age: 33
End: 2023-10-15

## 2024-01-09 ENCOUNTER — E-VISIT (OUTPATIENT)
Dept: FAMILY MEDICINE | Facility: CLINIC | Age: 34
End: 2024-01-09

## 2024-01-09 DIAGNOSIS — H93.8X9 SENSATION OF PLUGGED EAR, UNSPECIFIED LATERALITY: Primary | ICD-10-CM

## 2024-01-09 PROCEDURE — 99207 PR NON-BILLABLE SERV PER CHARTING: CPT | Performed by: STUDENT IN AN ORGANIZED HEALTH CARE EDUCATION/TRAINING PROGRAM

## 2024-01-09 NOTE — PATIENT INSTRUCTIONS
Thank you for choosing us for your care. I think an in-clinic visit would be best next steps based on your symptoms. Please schedule a clinic appointment; you won t be charged for this eVisit.      You can schedule an appointment right here in [facility], or call [phone]

## 2024-01-16 ENCOUNTER — MYC MEDICAL ADVICE (OUTPATIENT)
Dept: INTERNAL MEDICINE | Facility: CLINIC | Age: 34
End: 2024-01-16
Payer: COMMERCIAL

## 2024-01-22 ENCOUNTER — OFFICE VISIT (OUTPATIENT)
Dept: INTERNAL MEDICINE | Facility: CLINIC | Age: 34
End: 2024-01-22
Payer: COMMERCIAL

## 2024-01-22 VITALS
DIASTOLIC BLOOD PRESSURE: 68 MMHG | BODY MASS INDEX: 21.92 KG/M2 | HEIGHT: 66 IN | RESPIRATION RATE: 14 BRPM | WEIGHT: 136.4 LBS | SYSTOLIC BLOOD PRESSURE: 100 MMHG | OXYGEN SATURATION: 94 % | HEART RATE: 119 BPM | TEMPERATURE: 98.3 F

## 2024-01-22 DIAGNOSIS — Z12.4 CERVICAL CANCER SCREENING: ICD-10-CM

## 2024-01-22 DIAGNOSIS — Z00.00 ROUTINE GENERAL MEDICAL EXAMINATION AT A HEALTH CARE FACILITY: Primary | ICD-10-CM

## 2024-01-22 PROCEDURE — G0145 SCR C/V CYTO,THINLAYER,RESCR: HCPCS | Performed by: NURSE PRACTITIONER

## 2024-01-22 PROCEDURE — 99395 PREV VISIT EST AGE 18-39: CPT | Performed by: NURSE PRACTITIONER

## 2024-01-22 PROCEDURE — 87624 HPV HI-RISK TYP POOLED RSLT: CPT | Performed by: NURSE PRACTITIONER

## 2024-01-22 PROCEDURE — G0124 SCREEN C/V THIN LAYER BY MD: HCPCS | Performed by: PATHOLOGY

## 2024-01-22 NOTE — PATIENT INSTRUCTIONS
Preventive Health Recommendations    Check on insurance coverage for the hepatitis B vaccine (you need 2 more doses to complete the series)     Female Ages 26 - 39  Yearly exam:   See your health care provider every year in order to  Review health changes.   Discuss preventive care.    Review your medicines if you your doctor has prescribed any.    Until age 30: Get a Pap test every three years (more often if you have had an abnormal result).    After age 30: Talk to your doctor about whether you should have a Pap test every 3 years or have a Pap test with HPV screening every 5 years.   You do not need a Pap test if your uterus was removed (hysterectomy) and you have not had cancer.  You should be tested each year for STDs (sexually transmitted diseases), if you're at risk.   Talk to your provider about how often to have your cholesterol checked.  If you are at risk for diabetes, you should have a diabetes test (fasting glucose).  Shots: Get a flu shot each year. Get a tetanus shot every 10 years.   Nutrition:   Eat at least 5 servings of fruits and vegetables each day.  Eat whole-grain bread, whole-wheat pasta and brown rice instead of white grains and rice.  Get adequate Calcium and Vitamin D.     Lifestyle  Exercise at least 150 minutes a week (30 minutes a day, 5 days of the week). This will help you control your weight and prevent disease.  Limit alcohol to one drink per day.  No smoking.   Wear sunscreen to prevent skin cancer.  See your dentist every six months for an exam and cleaning.

## 2024-01-22 NOTE — PROGRESS NOTES
"Preventive Care Visit  [facility]  Nettie Booker NP  Jan 22, 2024       SUBJECTIVE:   Ascencion is a 33 year old, presenting for the following:  Physical        1/22/2024     9:16 AM   Additional Questions   Roomed by Priscila ORTIZ     History of Present Illness       Reason for visit:  Physical    She eats 0-1 servings of fruits and vegetables daily.She consumes 2 sweetened beverage(s) daily.She exercises with enough effort to increase her heart rate 9 or less minutes per day.  She exercises with enough effort to increase her heart rate 3 or less days per week.   She is taking medications regularly.    In general, how would you rate your overall physical health? fair  Outside of work, how many days during the week do you exercise?1 day/week  Outside of work, approximately how many minutes a day do you exercise?less than 15 minutes  If you drink alcohol do you typically have >3 drinks per day or >7 drinks per week? Yes - AUDIT SCORE:   3 per week depends on occasion.   Do you usually eat at least 4 servings of fruit and vegetables a day, include whole grains & fiber and avoid regularly eating high fat or \"junk\" foods? No  Do you have any problems taking medications regularly? No  Do you have any side effects from medications? None    Periods- light, monthly. Has a Mirena IUD.     H/o abnormal pap smear in 2021, ASC-H +HR HPV type 16. She has been with the same partner for the past 3 years now. Declines STI screening.     Yesterday she had loose stools. She was able to keep down some saltine crackers and fluids. Today she is feeling well.     Today's PHQ-2 Score:       1/22/2024     7:50 AM   PHQ-2 ( 1999 [employer])   Q1: Little interest or pleasure in doing things 0   Q2: Feeling down, depressed or hopeless 0   PHQ-2 Score 0   Q1: Little interest or pleasure in doing things Not at all   Q2: Feeling down, depressed or hopeless Not at all   PHQ-2 Score 0       Social History     Tobacco Use    Smoking " "status: Never    Smokeless tobacco: Never   Substance Use Topics    Alcohol use: Not on file           10/20/2021     2:05 PM   Alcohol Use   Prescreen: >3 drinks/day or >7 drinks/week? Not Applicable     Reviewed orders with patient.  Reviewed health maintenance and updated orders accordingly - Yes      Breast Cancer Screenin/22/2024     7:52 AM   Breast CA Risk Assessment (FHS-7)   Do you have a family history of breast, colon, or ovarian cancer? No / Unknown       Pertinent mammograms are reviewed under the imaging tab.        Latest Ref Rng & Units 10/20/2021     2:58 PM   PAP / HPV   PAP  Atypical squamous cells of undetermined significance, cannot exclude high-grade squamous intraepithelial lesion (ASC-H)    HPV 16 DNA Negative Positive    HPV 18 DNA Negative Negative    Other HR HPV Negative Positive      Reviewed and updated as needed this visit by clinical staff   Tobacco  Allergies  Meds  Problems  Med Hx  Surg Hx  Fam Hx          Reviewed and updated as needed this visit by Provider   Tobacco  Allergies  Meds  Problems  Med Hx  Surg Hx  Fam Hx              OBJECTIVE:   /68   Pulse 119   Temp 98.3  F (36.8  C) (Oral)   Resp 14   Ht 1.676 m (5' 6\")   Wt 61.9 kg (136 lb 6.4 oz)   SpO2 94%   BMI 22.02 kg/m     Estimated body mass index is 22.02 kg/m  as calculated from the following:    Height as of this encounter: 1.676 m (5' 6\").    Weight as of this encounter: 61.9 kg (136 lb 6.4 oz).    Physical Exam  GENERAL: alert and no distress  EYES: Eyes grossly normal to inspection, conjunctivae and sclerae normal  HENT: ear canals and TM's normal, mouth without ulcers or lesions  RESP: lungs clear to auscultation - no rales, rhonchi or wheezes  CV: regular rate and rhythm, normal S1 S2, no S3 or S4, no murmur, click or rub, no peripheral edema  Genital: EXTERNAL GENITALIA: Normal appearing vulva without masses, tenderness or lesions. PERINEUM: normal and intact. URETHRAL MEATUS: " normal VAGINA:  vagina with normal color and without discharge or lesions. CERVIX: normal appearing cervix without discharge or lesions. IUD strings visualized. Non-friable  MS: no gross musculoskeletal defects noted, no edema  NEURO: Normal strength and tone, mentation intact and speech normal  PSYCH: mentation appears normal, affect normal/bright        ASSESSMENT/PLAN:     Problem List Items Addressed This Visit    None  Visit Diagnoses       Routine general medical examination at a health care facility    -  Primary    Cervical cancer screening        Relevant Orders    Pap Screen with HPV - recommended age 30 - 65 years          - She declines an influenza vaccine. She wants to check on insurance coverage for the hepatitis B vaccine before she proceeds with the second vaccine in this series         Counseling  Reviewed preventive health counseling, as reflected in patient instructions        She reports that she has never smoked. She has never used smokeless tobacco.      Signed Electronically by: Nettie Booker NP

## 2024-01-25 LAB
BKR LAB AP GYN ADEQUACY: ABNORMAL
BKR LAB AP GYN INTERPRETATION: ABNORMAL
BKR LAB AP GYN OTHER FINDINGS: ABNORMAL
BKR LAB AP HPV REFLEX: ABNORMAL
BKR LAB AP LMP: ABNORMAL
BKR LAB AP PREVIOUS ABNL DX: ABNORMAL
BKR LAB AP PREVIOUS ABNORMAL: ABNORMAL
PATH REPORT.COMMENTS IMP SPEC: ABNORMAL
PATH REPORT.COMMENTS IMP SPEC: ABNORMAL
PATH REPORT.RELEVANT HX SPEC: ABNORMAL

## 2024-01-29 ENCOUNTER — PATIENT OUTREACH (OUTPATIENT)
Dept: INTERNAL MEDICINE | Facility: CLINIC | Age: 34
End: 2024-01-29
Payer: COMMERCIAL

## 2024-01-29 PROBLEM — R87.611 ATYPICAL SQUAMOUS CELLS CANNOT EXCLUDE HIGH GRADE SQUAMOUS INTRAEPITHELIAL LESION ON CYTOLOGIC SMEAR OF CERVIX (ASC-H): Status: ACTIVE | Noted: 2021-10-20

## 2024-04-22 ENCOUNTER — OFFICE VISIT (OUTPATIENT)
Dept: INTERNAL MEDICINE | Facility: CLINIC | Age: 34
End: 2024-04-22
Payer: COMMERCIAL

## 2024-04-22 VITALS
RESPIRATION RATE: 16 BRPM | DIASTOLIC BLOOD PRESSURE: 72 MMHG | SYSTOLIC BLOOD PRESSURE: 107 MMHG | HEIGHT: 66 IN | WEIGHT: 137.4 LBS | HEART RATE: 91 BPM | TEMPERATURE: 97.6 F | BODY MASS INDEX: 22.08 KG/M2 | OXYGEN SATURATION: 100 %

## 2024-04-22 DIAGNOSIS — R87.611 ATYPICAL SQUAMOUS CELLS CANNOT EXCLUDE HIGH GRADE SQUAMOUS INTRAEPITHELIAL LESION ON CYTOLOGIC SMEAR OF CERVIX (ASC-H): Primary | ICD-10-CM

## 2024-04-22 DIAGNOSIS — R87.810 ASCUS WITH POSITIVE HIGH RISK HPV CERVICAL: ICD-10-CM

## 2024-04-22 DIAGNOSIS — R87.610 ASCUS WITH POSITIVE HIGH RISK HPV CERVICAL: ICD-10-CM

## 2024-04-22 PROCEDURE — 57452 EXAM OF CERVIX W/SCOPE: CPT | Performed by: NURSE PRACTITIONER

## 2024-04-22 PROCEDURE — 99207 ZZHC COLP CERVIX/UPPER VAGINA: CPT | Performed by: NURSE PRACTITIONER

## 2024-04-22 NOTE — PROGRESS NOTES
"Colposcopy Procedure Note    Indications: Recent pap smear showed ASCUS +HR HPV type 16 and other.  No tobacco use.     Procedure Details   /72 (BP Location: Left arm, Patient Position: Sitting, Cuff Size: Adult Regular)   Pulse 91   Temp 97.6  F (36.4  C) (Oral)   Resp 16   Ht 1.676 m (5' 6\")   Wt 62.3 kg (137 lb 6.4 oz)   LMP 04/07/2024 (Within Weeks)   SpO2 100%   BMI 22.18 kg/m      Body mass index is 22.18 kg/m .    The reason for procedure is explained, and verbal informed consent is obtained.    Speculum is placed in the vagina and visualization of cervix is achieved.   Visualization of the cervix: fully visible  Visualization of the squamocolumnar junction: fully visible    The cervix is swabbed with 3% acetic acid solution. There are no acetowhite changes. Green light filter is applied, no abnormal vessels seen.       Impression: Normal/benign    Plan: Post procedure instructions are reviewed.  The role of HPV in causing cervical pap smear abnormalities, dysplasia, and cancer is reviewed with the patient. Repeat co-testing in 1 year.   "

## 2024-05-17 ENCOUNTER — PATIENT OUTREACH (OUTPATIENT)
Dept: FAMILY MEDICINE | Facility: CLINIC | Age: 34
End: 2024-05-17
Payer: COMMERCIAL

## 2024-05-17 DIAGNOSIS — R87.611 ATYPICAL SQUAMOUS CELLS CANNOT EXCLUDE HIGH GRADE SQUAMOUS INTRAEPITHELIAL LESION ON CYTOLOGIC SMEAR OF CERVIX (ASC-H): Primary | ICD-10-CM

## 2025-01-03 PROBLEM — R87.611 ATYPICAL SQUAMOUS CELLS CANNOT EXCLUDE HIGH GRADE SQUAMOUS INTRAEPITHELIAL LESION ON CYTOLOGIC SMEAR OF CERVIX (ASC-H): Status: ACTIVE | Noted: 2021-10-20

## 2025-03-15 ENCOUNTER — HEALTH MAINTENANCE LETTER (OUTPATIENT)
Age: 35
End: 2025-03-15